# Patient Record
Sex: FEMALE | Race: BLACK OR AFRICAN AMERICAN | NOT HISPANIC OR LATINO | Employment: FULL TIME | URBAN - METROPOLITAN AREA
[De-identification: names, ages, dates, MRNs, and addresses within clinical notes are randomized per-mention and may not be internally consistent; named-entity substitution may affect disease eponyms.]

---

## 2017-11-30 PROBLEM — E04.9 GOITER: Status: ACTIVE | Noted: 2017-11-30

## 2017-11-30 PROBLEM — E61.1 IRON DEFICIENCY: Status: ACTIVE | Noted: 2017-11-30

## 2017-11-30 PROBLEM — D25.0 SUBMUCOUS AND SUBSEROUS LEIOMYOMA OF UTERUS: Status: ACTIVE | Noted: 2017-11-30

## 2017-11-30 PROBLEM — D25.2 SUBMUCOUS AND SUBSEROUS LEIOMYOMA OF UTERUS: Status: ACTIVE | Noted: 2017-11-30

## 2017-12-21 PROBLEM — G43.009 MIGRAINE WITHOUT AURA AND WITHOUT STATUS MIGRAINOSUS, NOT INTRACTABLE: Status: ACTIVE | Noted: 2017-12-21

## 2018-05-01 ENCOUNTER — TELEPHONE (OUTPATIENT)
Dept: ORTHOPEDICS | Facility: CLINIC | Age: 48
End: 2018-05-01

## 2018-05-01 NOTE — TELEPHONE ENCOUNTER
Called the patient and left a message for her to give us a call back.    ----- Message from Yamile Cantrell sent at 5/1/2018 11:15 AM CDT -----  Contact: pt  States she's calling to see if we received a referral from her insurance company. Please call pt at [phone]. Thank you

## 2018-05-03 DIAGNOSIS — M79.641 PAIN IN BOTH HANDS: ICD-10-CM

## 2018-05-03 DIAGNOSIS — M79.642 PAIN IN BOTH HANDS: ICD-10-CM

## 2018-05-03 DIAGNOSIS — M79.601 RIGHT ARM PAIN: Primary | ICD-10-CM

## 2018-05-07 ENCOUNTER — TELEPHONE (OUTPATIENT)
Dept: ORTHOPEDICS | Facility: CLINIC | Age: 48
End: 2018-05-07

## 2018-05-07 ENCOUNTER — OFFICE VISIT (OUTPATIENT)
Dept: ORTHOPEDICS | Facility: CLINIC | Age: 48
End: 2018-05-07
Payer: COMMERCIAL

## 2018-05-07 ENCOUNTER — HOSPITAL ENCOUNTER (OUTPATIENT)
Dept: RADIOLOGY | Facility: HOSPITAL | Age: 48
Discharge: HOME | End: 2018-05-07
Attending: PHYSICIAN ASSISTANT
Payer: COMMERCIAL

## 2018-05-07 VITALS
SYSTOLIC BLOOD PRESSURE: 121 MMHG | DIASTOLIC BLOOD PRESSURE: 69 MMHG | WEIGHT: 177.25 LBS | HEIGHT: 67 IN | BODY MASS INDEX: 27.82 KG/M2 | HEART RATE: 53 BPM | RESPIRATION RATE: 12 BRPM

## 2018-05-07 DIAGNOSIS — M25.511 ACUTE PAIN OF RIGHT SHOULDER: ICD-10-CM

## 2018-05-07 DIAGNOSIS — M25.531 RIGHT WRIST PAIN: ICD-10-CM

## 2018-05-07 DIAGNOSIS — M19.011 DJD OF RIGHT AC (ACROMIOCLAVICULAR) JOINT: ICD-10-CM

## 2018-05-07 DIAGNOSIS — M50.30 DDD (DEGENERATIVE DISC DISEASE), CERVICAL: ICD-10-CM

## 2018-05-07 DIAGNOSIS — M79.642 PAIN IN BOTH HANDS: ICD-10-CM

## 2018-05-07 DIAGNOSIS — M79.641 PAIN IN BOTH HANDS: ICD-10-CM

## 2018-05-07 DIAGNOSIS — M79.601 RIGHT ARM PAIN: ICD-10-CM

## 2018-05-07 DIAGNOSIS — M79.18 MYOFASCIAL PAIN ON RIGHT SIDE: ICD-10-CM

## 2018-05-07 DIAGNOSIS — M75.01 ADHESIVE CAPSULITIS OF RIGHT SHOULDER: Primary | ICD-10-CM

## 2018-05-07 DIAGNOSIS — M75.21 BICEPS TENDINITIS OF RIGHT SHOULDER: ICD-10-CM

## 2018-05-07 PROCEDURE — 73030 X-RAY EXAM OF SHOULDER: CPT | Mod: TC,FY,PO,RT

## 2018-05-07 PROCEDURE — 72050 X-RAY EXAM NECK SPINE 4/5VWS: CPT | Mod: TC,FY,PO

## 2018-05-07 PROCEDURE — 72050 X-RAY EXAM NECK SPINE 4/5VWS: CPT | Mod: 26,,, | Performed by: RADIOLOGY

## 2018-05-07 PROCEDURE — 20610 DRAIN/INJ JOINT/BURSA W/O US: CPT | Mod: RT,S$GLB,, | Performed by: PHYSICIAN ASSISTANT

## 2018-05-07 PROCEDURE — 73110 X-RAY EXAM OF WRIST: CPT | Mod: 26,RT,, | Performed by: RADIOLOGY

## 2018-05-07 PROCEDURE — 73030 X-RAY EXAM OF SHOULDER: CPT | Mod: 26,RT,, | Performed by: RADIOLOGY

## 2018-05-07 PROCEDURE — 73110 X-RAY EXAM OF WRIST: CPT | Mod: 26,LT,, | Performed by: RADIOLOGY

## 2018-05-07 PROCEDURE — 99203 OFFICE O/P NEW LOW 30 MIN: CPT | Mod: 25,SA,S$GLB, | Performed by: PHYSICIAN ASSISTANT

## 2018-05-07 PROCEDURE — 99999 PR PBB SHADOW E&M-EST. PATIENT-LVL III: CPT | Mod: PBBFAC,,, | Performed by: PHYSICIAN ASSISTANT

## 2018-05-07 PROCEDURE — 73110 X-RAY EXAM OF WRIST: CPT | Mod: 50,TC,FY,PO

## 2018-05-07 PROCEDURE — 3008F BODY MASS INDEX DOCD: CPT | Mod: CPTII,S$GLB,, | Performed by: PHYSICIAN ASSISTANT

## 2018-05-07 RX ORDER — METHYLPREDNISOLONE ACETATE 80 MG/ML
80 INJECTION, SUSPENSION INTRA-ARTICULAR; INTRALESIONAL; INTRAMUSCULAR; SOFT TISSUE ONCE
Status: COMPLETED | OUTPATIENT
Start: 2018-05-07 | End: 2018-05-07

## 2018-05-07 RX ORDER — CELECOXIB 200 MG/1
200 CAPSULE ORAL DAILY
Qty: 30 CAPSULE | Refills: 1 | Status: SHIPPED | OUTPATIENT
Start: 2018-05-07 | End: 2018-08-10

## 2018-05-07 RX ADMIN — METHYLPREDNISOLONE ACETATE 80 MG: 80 INJECTION, SUSPENSION INTRA-ARTICULAR; INTRALESIONAL; INTRAMUSCULAR; SOFT TISSUE at 01:05

## 2018-05-07 NOTE — PROGRESS NOTES
Subjective:      Patient ID: Elizabeth Turner is a 47 y.o. female.    Chief Complaint: Pain of the Right Shoulder      HPI: Elizabeth Turner  is a 47 y.o. female who c/o Pain of the Right Shoulder   for duration of 2-1/2 months.  She points to the right shoulder as to where the main source of her pain is.  She denies an inciting injury.  Pain level is 4 out of 10 in severity.  Quality is throbbing.  Alleviating factors ibuprofen, heat, ice.  Aggravating factors include laying on the right shoulder at nighttime, reaching overhead, and reaching back.  She had an intramuscular steroid injection done last week which also seems to have helped some of her pain.  She is also having some wrist pain.  It has improved with the wrist splint.  She denies numbness and tingling in her fingers.  She also has pain in the trapezial area of the bilateral shoulders and upper back.  She equates this with muscular pain.  She has been given a prescription for Flexeril, but does not tolerate medications very well.  She has not been taking it.  She was seen [facility] last week and they prescribed meloxicam 7.5 mg.  She could not tolerate this due to GI side effects.  She has had to stop taking it.    Review of Systems   Constitution: Negative for fever.   Cardiovascular: Negative for chest pain.   Respiratory: Negative for cough and shortness of breath.    Skin: Negative for rash.   Musculoskeletal: Positive for joint pain, myalgias, neck pain and stiffness. Negative for joint swelling.   Gastrointestinal: Negative for heartburn.   Neurological: Negative for headaches and numbness.         Objective:        General    Nursing note and vitals reviewed.  Constitutional: She is oriented to person, place, and time. She appears well-developed and well-nourished.   HENT:   Head: Normocephalic and atraumatic.   Eyes: EOM are normal.   Cardiovascular: Normal rate and regular rhythm.    Pulmonary/Chest: Effort normal.   Abdominal: Soft.    Neurological: She is alert and oriented to person, place, and time.   Psychiatric: She has a normal mood and affect. Her behavior is normal.         Back (L-Spine & T-Spine) / Neck (C-Spine) Exam     Tenderness   The patient is tender to palpation of the right trapezial.     Neck (C-Spine) Range of Motion   Flexion:     Normal  Extension: Normal  Right Rotation: normal  Left Rotation: normal    Spinal Sensation   Right Side Sensation  C-Spine Level: normal   Left Side Sensation  C-Spine Level: normal    Neck (C-Spine) Tests   Spurling's Test   Left:  Negative  Right: negative      Right Hand/Wrist Exam     Inspection   Scars: Wrist - absent   Effusion: Wrist - absent   Bruising: Wrist - absent   Deformity: Wrist - deformity     Range of Motion     Wrist   Extension: normal   Flexion: normal   Pronation: normal   Supination: normal     Tests   Tinels Sign (Medial Nerve): negative    Atrophy   Thenar:  negative  Hypothenar:  negative  Intrinsic:  negative  1st Dorsal Interosseous: negative    Other     Neuorologic Exam    Median Distribution: normal  Ulnar Distribution: normal  Radial Distribution: normal      Right Elbow Exam     Tests Tinel's Sign (cubital tunnel): negative      Right Shoulder Exam     Inspection/Observation   Swelling: absent  Bruising: absent  Scars: absent  Deformity: absent  Scapular Dyskinesia: positive    Tenderness   The patient is tender to palpation of the medial scapula, acromioclavicular joint and biceps tendon.    Range of Motion   Active Abduction:  90 abnormal   Passive Abduction:  110 abnormal   Forward Flexion: normal   Forward Elevation: normal  Adduction: normal  External Rotation 0 degrees: normal   Internal Rotation 0 degrees:  L5 abnormal     Tests & Signs   Cross Arm: negative  Drop Arm: negative  Hawkin's test: positive  Impingement: positive  Active Compression Test (Jefferson's Sign): positive  Speed's Test: positive    Other   Sensation: normal    Muscle Strength   Right  Upper Extremity   Shoulder Abduction: 5/5   Shoulder Internal Rotation: 5/5   Shoulder External Rotation: 5/5   Deltoid:  5/5  Triceps:  5/5  Wrist Extension: 5/5/5   Wrist Flexion: 5/5/5   : 5/5/5   Finger Flexors:  5/5  Pinch Mechanism: 5/5  Left Upper Extremity  Biceps: 5/5/5   Deltoid:  5/5  Triceps:  5/5  Wrist Extension: 5/5/5   Wrist Flexion: 5/5/5   :  5/5/5   Finger Flexors:  5/5  Pinch Mechanism: 5/5    Reflexes     Left Side  Biceps:  2+  Brachioradialis:  2+    Right Side   Biceps:  2+  Brachioradialis:  2+    Vascular Exam     Right Pulses      Radial:                    2+      Left Pulses      Radial:                    2+      Capillary Refill  Right Hand: normal capillary refill            Xray:   Right shoulder from today images and report were reviewed today.  I agree with the radiologist's interpretation.  The glenohumeral and acromioclavicular joints are intact.  No intraosseous lesion.  No acute fracture or dislocation.  Cervical spine from today images and report were reviewed today.  I agree with the radiologist's interpretation.  There is straightening of the usual cervical lordosis.  Degenerative vertebral endplate spurring and facet arthropathy at multiple levels with moderate narrowing of the C5-6 and C6-7 disc spaces.  Mild bony foraminal encroachment bilaterally at C6-7 and on the left at C3-4.  Odontoid is intact.  No fracture or subluxation.  Bilateral wrists from today images and report were reviewed today.  I agree with the radiologist's interpretation.  The joint spaces are preserved.  No periarticular erosions.  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  No acute fracture or dislocation.    Assessment:       Encounter Diagnoses   Name Primary?    Adhesive capsulitis of right shoulder Yes    Acute pain of right shoulder     Biceps tendinitis of right shoulder     DJD of right AC (acromioclavicular) joint     Right wrist pain     Myofascial  pain on right side     DDD (degenerative disc disease), cervical           Plan:       Elizabeth was seen today for pain.    Diagnoses and all orders for this visit:    Adhesive capsulitis of right shoulder  -     methylPREDNISolone acetate injection 80 mg; Inject 1 mL (80 mg total) into the articular space once.  -     Ambulatory Referral to Physical/Occupational Therapy  -     celecoxib (CELEBREX) 200 MG capsule; Take 1 capsule (200 mg total) by mouth once daily. Take with food.  DC if develop GI side effects.    Acute pain of right shoulder  -     methylPREDNISolone acetate injection 80 mg; Inject 1 mL (80 mg total) into the articular space once.  -     Ambulatory Referral to Physical/Occupational Therapy  -     celecoxib (CELEBREX) 200 MG capsule; Take 1 capsule (200 mg total) by mouth once daily. Take with food.  DC if develop GI side effects.    Biceps tendinitis of right shoulder  -     methylPREDNISolone acetate injection 80 mg; Inject 1 mL (80 mg total) into the articular space once.  -     Ambulatory Referral to Physical/Occupational Therapy  -     celecoxib (CELEBREX) 200 MG capsule; Take 1 capsule (200 mg total) by mouth once daily. Take with food.  DC if develop GI side effects.    DJD of right AC (acromioclavicular) joint  -     methylPREDNISolone acetate injection 80 mg; Inject 1 mL (80 mg total) into the articular space once.  -     Ambulatory Referral to Physical/Occupational Therapy  -     celecoxib (CELEBREX) 200 MG capsule; Take 1 capsule (200 mg total) by mouth once daily. Take with food.  DC if develop GI side effects.    Right wrist pain  -     Ambulatory Referral to Physical/Occupational Therapy  -     celecoxib (CELEBREX) 200 MG capsule; Take 1 capsule (200 mg total) by mouth once daily. Take with food.  DC if develop GI side effects.    Myofascial pain on right side  -     Ambulatory Referral to Physical/Occupational Therapy  -     celecoxib (CELEBREX) 200 MG capsule; Take 1 capsule (200 mg  total) by mouth once daily. Take with food.  DC if develop GI side effects.    DDD (degenerative disc disease), cervical  -     Ambulatory Referral to Physical/Occupational Therapy  -     celecoxib (CELEBREX) 200 MG capsule; Take 1 capsule (200 mg total) by mouth once daily. Take with food.  DC if develop GI side effects.    Ms. Turner is a new patient with a new problem as above.  She has adhesive capsulitis in the right shoulder.  I recommend aggressive range of motion through physical therapy.  I have encouraged her to use the shoulder to prevent further stiffness which can increase pain.  She understands that regaining range of motion is very important to help pain in the shoulder.  We have also discussed risks and benefits of a steroid injection.  She wishes to proceed with that today.  Although, she does have degenerative changes within her neck, I do not think that her pain is radicular in nature.  However, she understands that he has degenerative disks with some spinal radial tunnel stenosis at multiple levels in the neck, ultimately could be contributing to some of the pain in the right arm.  Physical therapy will also work with her on the neck as well as myofascial release on the right side.  Incorporate dry needling, stimulation, heat, and manual therapeutic massage as necessary to improve her symptoms.  She is not tolerate many medications very well.  She has failed  oral NSAIDs in the form of meloxicam as well as over-the-counter anti-inflammatories.  I recommend a prescription for Celebrex as above.  If she develops any GI side effects, she will discontinue the medication and contact my office.  I will see her back in the office in 6 weeks to reevaluate her progress.  Physical therapy may also work with her on her wrist.  I will check on her wrist at follow-up.  She verbalizes understanding and agrees with the above plan.  Follow-up in about 6 weeks (around 6/18/2018).        Right Shoulder Injection  Report:  After verbal consent was obtained for right shoulder injection, patient ID, site, and side were verified.  The  right  Shoulder was sterilly prepped in the standard fashion.  A 22-gauge needle was introduced into right subacromial space from the posterior portal approach without complication. The right shoulder was then injected with 20 mg lidocaine plain and 80 mg depomedrol.  A sterile bandaid was applied.  The patient was informed to apply an ice pack approximately 10min once arriving home and not to do anything strenuous for 24hours. She was instructed to call if there were any problems. The patient was discharged in stable condition.    The patient understands, chooses and consents to this plan and accepts all   the risks which include but are not limited to the risks mentioned above.     Disclaimer: This note was prepared using a voice recognition system and is likely to have sound alike errors within the text.

## 2018-05-07 NOTE — LETTER
May 7, 2018      ANGELINA Blackburn  [address] [place] [address]           [facility]  [address] [place] [address]  Phone: [phone]  Fax: [phone]          Patient: Elizabeth Turner   MR Number: [id number]   YOB: 1970   Date of Visit: 5/7/2018       Dear Chilango Jeffrey:    Thank you for referring Elizabeth Turner to me for evaluation. Attached you will find relevant portions of my assessment and plan of care.    If you have questions, please do not hesitate to call me. I look forward to following Elizabeth Turner along with you.    Sincerely,    TRISH Kiranosure  CC:  No Recipients    If you would like to receive this communication electronically, please contact [email or web address] or [phone] to request more information on [facility] Link access.    For providers and/or their staff who would like to refer a patient to [facility], please contact us through our one-stop-shop provider referral line, [facility] , at [phone].    If you feel you have received this communication in error or would no longer like to receive these types of communications, please e-mail [email or web address]

## 2018-05-07 NOTE — TELEPHONE ENCOUNTER
----- Message from Zonia Rossi sent at 5/7/2018  7:53 AM CDT -----  Contact: PT  Call pt at  [phone] pt states that she already had some xrays done.

## 2018-05-17 ENCOUNTER — TELEPHONE (OUTPATIENT)
Dept: ORTHOPEDICS | Facility: CLINIC | Age: 48
End: 2018-05-17

## 2018-05-17 NOTE — TELEPHONE ENCOUNTER
Called pt to reschedule apt. Her apt was for June 22, her apt was reschedule because Lauren will be out of office. She will be seen that following Monday June 25.

## 2018-06-25 ENCOUNTER — OFFICE VISIT (OUTPATIENT)
Dept: ORTHOPEDICS | Facility: CLINIC | Age: 48
End: 2018-06-25
Payer: COMMERCIAL

## 2018-06-25 VITALS
SYSTOLIC BLOOD PRESSURE: 118 MMHG | BODY MASS INDEX: 27.82 KG/M2 | HEART RATE: 65 BPM | DIASTOLIC BLOOD PRESSURE: 80 MMHG | WEIGHT: 177.25 LBS | RESPIRATION RATE: 12 BRPM | HEIGHT: 67 IN

## 2018-06-25 DIAGNOSIS — M25.511 ACUTE PAIN OF RIGHT SHOULDER: ICD-10-CM

## 2018-06-25 DIAGNOSIS — M75.01 ADHESIVE CAPSULITIS OF RIGHT SHOULDER: Primary | ICD-10-CM

## 2018-06-25 DIAGNOSIS — M19.011 DJD OF RIGHT AC (ACROMIOCLAVICULAR) JOINT: ICD-10-CM

## 2018-06-25 DIAGNOSIS — M79.18 MYOFASCIAL PAIN ON RIGHT SIDE: ICD-10-CM

## 2018-06-25 PROCEDURE — 3008F BODY MASS INDEX DOCD: CPT | Mod: CPTII,S$GLB,, | Performed by: PHYSICIAN ASSISTANT

## 2018-06-25 PROCEDURE — 99213 OFFICE O/P EST LOW 20 MIN: CPT | Mod: S$GLB,,, | Performed by: PHYSICIAN ASSISTANT

## 2018-06-25 PROCEDURE — 99999 PR PBB SHADOW E&M-EST. PATIENT-LVL III: CPT | Mod: PBBFAC,,, | Performed by: PHYSICIAN ASSISTANT

## 2018-06-25 NOTE — PROGRESS NOTES
Patient ID: Elizabeth Turner is a 47 y.o. female.    Chief Complaint: Pain and Follow-up of the Right Shoulder      HPI: Elizabeth Turner  is a 47 y.o. female who c/o Pain and Follow-up of the Right Shoulder   for duration of about 4 months now.  Her pain level today is 3/10 in severity.  I have been treating her for adhesive capsulitis of right shoulder. She is improved since her last office visit about a month and a half ago.  She has been doing physical therapy at [facility] in [place].  She states the steroid injection at her last office visit helped alleviate her symptoms. Pain is worsened with abduction, internal rotation. Alleviating factors include physical therapy, steroid injections, anti-inflammatories.  Quality is an intermittent throbbing pain. No associated numbness or tingling.    Past Medical History:   Diagnosis Date    Encounter for general adult medical examination without abnormal findings 05/16/2016    Iron deficiency anemia     Migraine      Past Surgical History:   Procedure Laterality Date    TUBAL LIGATION       Family History   Problem Relation Age of Onset    Cancer Mother     Diabetes Mother     Hypertension Mother     Hypertension Father     Diabetes Sister     Hypertension Sister     Hypertension Maternal Grandfather     Stroke Paternal Grandfather      Social History     Social History    Marital status:      Spouse name: N/A    Number of children: N/A    Years of education: N/A     Occupational History    Not on file.     Social History Main Topics    Smoking status: Never Smoker    Smokeless tobacco: Never Used    Alcohol use No    Drug use: No    Sexual activity: Yes     Partners: Male     Other Topics Concern    Not on file     Social History Narrative    No narrative on file     Medication List with Changes/Refills   Current Medications    CELECOXIB (CELEBREX) 200 MG CAPSULE    Take 1 capsule (200 mg total) by mouth once daily. Take with food.  DC if  develop GI side effects.    SOILA PRIM-LINOLEIC-GAMOLENIC AC (PRIMROSE OIL) 1,000 MG CAP    Take by mouth.     Review of patient's allergies indicates:  No Known Allergies        Objective:        General    Nursing note and vitals reviewed.  Constitutional: She is oriented to person, place, and time. She appears well-developed and well-nourished.   HENT:   Head: Normocephalic and atraumatic.   Eyes: EOM are normal.   Cardiovascular: Normal rate and regular rhythm.    Pulmonary/Chest: Effort normal.   Abdominal: Soft.   Neurological: She is alert and oriented to person, place, and time.   Psychiatric: She has a normal mood and affect. Her behavior is normal.         Back (L-Spine & T-Spine) / Neck (C-Spine) Exam     Neck (C-Spine) Range of Motion   Flexion:     Normal  Extension: Normal  Right Rotation: normal  Left Rotation: normal    Spinal Sensation   Right Side Sensation  C-Spine Level: normal   Left Side Sensation  C-Spine Level: normal    Neck (C-Spine) Tests   Spurling's Test   Left:  Negative  Right: negative      Right Hand/Wrist Exam     Inspection   Scars: Wrist - absent   Effusion: Wrist - absent   Bruising: Wrist - absent   Deformity: Wrist - deformity     Range of Motion     Wrist   Extension: normal   Flexion: normal   Pronation: normal   Supination: normal     Tests   Tinels Sign (Medial Nerve): negative    Atrophy   Thenar:  negative  Hypothenar:  negative  Intrinsic:  negative  1st Dorsal Interosseous: negative    Other     Neuorologic Exam    Median Distribution: normal  Ulnar Distribution: normal  Radial Distribution: normal      Right Elbow Exam     Tests Tinel's Sign (cubital tunnel): negative      Right Shoulder Exam     Inspection/Observation   Swelling: absent  Bruising: absent  Scars: absent  Deformity: absent  Scapular Dyskinesia: positive    Range of Motion   Active Abduction: 120   Passive Abduction:  160 abnormal   Forward Flexion:  150 abnormal   Forward Elevation: abnormal  Adduction:  normal  External Rotation 0 degrees: normal   Internal Rotation 0 degrees:  L5 abnormal     Tests & Signs   Cross Arm: negative  Drop Arm: negative  Hawkin's test: negative  Impingement: negative  Active Compression Test (Worth's Sign): negative  Speed's Test: negative    Other   Sensation: normal    Comments:  Myofascial tenderness to palpation is much improved from last office visit.    Muscle Strength   Right Upper Extremity   Shoulder Abduction: 5/5   Shoulder Internal Rotation: 5/5   Shoulder External Rotation: 5/5   Deltoid:  5/5  Triceps:  5/5  Wrist Extension: 5/5/5   Wrist Flexion: 5/5/5   : 5/5/5   Finger Flexors:  5/5  Pinch Mechanism: 5/5  Left Upper Extremity  Biceps: 5/5/5   Deltoid:  5/5  Triceps:  5/5  Wrist Extension: 5/5/5   Wrist Flexion: 5/5/5   :  5/5/5   Finger Flexors:  5/5  Pinch Mechanism: 5/5    Reflexes     Left Side  Biceps:  2+  Brachioradialis:  2+    Right Side   Biceps:  2+  Brachioradialis:  2+    Vascular Exam     Right Pulses      Radial:                    2+      Left Pulses      Radial:                    2+      Capillary Refill  Right Hand: normal capillary refill              Assessment:       Encounter Diagnoses   Name Primary?    Adhesive capsulitis of right shoulder Yes    Acute pain of right shoulder     DJD of right AC (acromioclavicular) joint     Myofascial pain on right side           Plan:       Elizabeth was seen today for pain and follow-up.    Diagnoses and all orders for this visit:    Adhesive capsulitis of right shoulder  -     Ambulatory consult to Physical Therapy    Acute pain of right shoulder    DJD of right AC (acromioclavicular) joint    Myofascial pain on right side    Ms. Johnson comes in today for follow-up on the above problems.  These are establish problems which are improved.  She still has a good bit of stiffness on exam.  However, her pain and her motion is improved significantly since her last office visit.  I recommend continuing  with physical therapy as well as doing a home exercise program geared towards working on shoulder flexibility.  I have given her some exercises today to do that.  She is concerned about the co payments for physical therapy.  She states that therapy is getting expensive.  She would like to do more of a directed home program.  I have cautioned her against losing any progress that she has made. I also recommend a pulley for home use.  She will follow up with me in the office in 6 weeks.  She understands that adhesive capsulitis is a long recovery with extended physical therapy.  She verbalizes understanding and agrees with the above plan.  She will let me know if she has problems prior to follow-up.  Follow-up in about 6 weeks (around 8/6/2018).          The patient understands, chooses and consents to this plan and accepts all   the risks which include but are not limited to the risks mentioned above.     Disclaimer: This note was prepared using a voice recognition system and is likely to have sound alike errors within the text.

## 2018-06-25 NOTE — PATIENT INSTRUCTIONS
Doorway Pectoral Stretch (Flexibility)    1.  an open doorway. Raise each arm up to the side, bent at 90-degree angles with palms forward. Rest your palms on the door frame.  2. Slowly step forward with one foot. Feel the stretch in your shoulders and chest. Stand upright and dont lean forward.  3. Hold for 30 seconds. Step back and relax.  4. Repeat 3 times, or as instructed.  Date Last Reviewed: 3/10/2016  © 2133-7141 [employer]. [address] [place] [address]. All rights reserved. This information is not intended as a substitute for professional medical care. Always follow your healthcare professional's instructions.        Exercises for Shoulder Flexibility: External Rotation    This stretch can help restore shoulder flexibility and relieve pain over time. When stretching, be sure to breathe deeply. Follow any special instructions from your doctor or physical therapist:  5.  a doorway. Grasp the doorjamb with the hand on the frozen side. Your arm should be bent.  6. With the other hand, hold the elbow on the frozen side firmly against your body.  7. Standing in the same spot, rotate your body away from the doorjamb. Stop when you feel the stretch in the shoulder. At first, try to hold the stretch for 5 seconds.  8. Work up to doing 3 sets of this stretch, 3 times a day. Work up to holding the stretch for 30 to 60 seconds.  Note: Keep your arms as still as you can. Over time, rotate your body a little more to enhance the stretch. But be careful not to twist your back.  Frozen shoulder  Frozen shoulder is another name for adhesive capsulitis, which causes restricted movement in the shoulder. If you have frozen shoulder, this stretch may cause discomfort, especially when you first get started. A few months may pass before you achieve the results you want. But once your shoulder heals, it rarely becomes frozen again. So stick to your stretching program. If you have any  questions, be sure to ask your doctor.   Date Last Reviewed: 8/16/2015  © 5305-1596 [employer]. [address] [place] [address]. All rights reserved. This information is not intended as a substitute for professional medical care. Always follow your healthcare professional's instructions.        Shoulder Abduction (Flexibility)    9. Stand up straight. Or lie on your back on the floor with legs straight. Hold a broomstick horizontally. Cup the top of the broomstick with your right hand. Hold the broomstick just above the broom with your left hand, palm facing down.  10. Push the broomstick to the right with your left hand, raising your right arm up. Raise it only as high as feels comfortable.  11. Hold for a few seconds. Return to the starting position.  12. Repeat 3 to 5 times, or as instructed. Build up to holding each stretch for 10 to 30 seconds.     Tip: If you dont have a broom, you can also do this exercise with a cane, mop, or yardstick.      Date Last Reviewed: 3/10/2016  © 7456-2728 [employer]. [address] [place] [address]. All rights reserved. This information is not intended as a substitute for professional medical care. Always follow your healthcare professional's instructions.        Shoulder Flexion (Flexibility)    13. Sit in a chair sideways next to a table. Lay your right arm on the table, pointed forward.  14. Slowly lean forward.  Slide your arm forward on the table. Feel the stretch in your right shoulder.  15. Hold for 5 seconds. Slowly sit back up.  16. Repeat 5 times.  17. Repeat this exercise 3 times a day, or as instructed.  Date Last Reviewed: 3/10/2016  © 1515-6382 [employer]. [address] [place] [address]. All rights reserved. This information is not intended as a substitute for professional medical care. Always follow your healthcare professional's instructions.

## 2018-08-10 ENCOUNTER — OFFICE VISIT (OUTPATIENT)
Dept: ORTHOPEDICS | Facility: CLINIC | Age: 48
End: 2018-08-10
Payer: COMMERCIAL

## 2018-08-10 VITALS — RESPIRATION RATE: 12 BRPM | DIASTOLIC BLOOD PRESSURE: 78 MMHG | HEART RATE: 61 BPM | SYSTOLIC BLOOD PRESSURE: 123 MMHG

## 2018-08-10 DIAGNOSIS — M25.511 ACUTE PAIN OF RIGHT SHOULDER: ICD-10-CM

## 2018-08-10 DIAGNOSIS — M75.01 ADHESIVE CAPSULITIS OF RIGHT SHOULDER: Primary | ICD-10-CM

## 2018-08-10 DIAGNOSIS — M19.011 DJD OF RIGHT AC (ACROMIOCLAVICULAR) JOINT: ICD-10-CM

## 2018-08-10 PROCEDURE — 99213 OFFICE O/P EST LOW 20 MIN: CPT | Mod: S$GLB,,, | Performed by: PHYSICIAN ASSISTANT

## 2018-08-10 PROCEDURE — 99999 PR PBB SHADOW E&M-EST. PATIENT-LVL III: CPT | Mod: PBBFAC,,, | Performed by: PHYSICIAN ASSISTANT

## 2018-08-10 NOTE — PROGRESS NOTES
Patient ID: Elizabeth Turner is a 48 y.o. female.    Chief Complaint: Pain and Follow-up of the Right Shoulder      HPI: Elizabeth Turner  is a 48 y.o. female who c/o Pain and Follow-up of the Right Shoulder   for duration of about 5 months.  I have been treating her for right shoulder adhesive capsulitis.  She is been working diligently with physical therapy at [facility] in [facility].  They have her working on a home exercise program for continued range of motion. She had her last visit with them on Monday.  Pain level today is 0/10.  She has occasional discomfort, but is doing much better than she was.  Her mom shoulder still feels somewhat stiff, but again, the motion has improved drastically.  Quality is an occasional twinge or ache.  It is intermittent.  Alleviating factors include physical therapy.  Aggravating factors include terminal motion. She denies radicular symptoms.  She denies numbness and tingling.     Past Medical History:   Diagnosis Date    Encounter for general adult medical examination without abnormal findings 05/16/2016    Iron deficiency anemia     Migraine      Past Surgical History:   Procedure Laterality Date    TUBAL LIGATION       Family History   Problem Relation Age of Onset    Cancer Mother     Diabetes Mother     Hypertension Mother     Hypertension Father     Diabetes Sister     Hypertension Sister     Hypertension Maternal Grandfather     Stroke Paternal Grandfather      Social History     Social History    Marital status:      Spouse name: N/A    Number of children: N/A    Years of education: N/A     Occupational History    Not on file.     Social History Main Topics    Smoking status: Never Smoker    Smokeless tobacco: Never Used    Alcohol use No    Drug use: No    Sexual activity: Yes     Partners: Male     Other Topics Concern    Not on file     Social History Narrative    No narrative on file     Medication List with Changes/Refills    Current Medications    SOILA PRIM-LINOLEIC-GAMOLENIC AC (PRIMROSE OIL) 1,000 MG CAP    Take by mouth.   Discontinued Medications    CELECOXIB (CELEBREX) 200 MG CAPSULE    Take 1 capsule (200 mg total) by mouth once daily. Take with food.  DC if develop GI side effects.     Review of patient's allergies indicates:  No Known Allergies        Objective:        General    Nursing note and vitals reviewed.  Constitutional: She is oriented to person, place, and time. She appears well-developed and well-nourished.   HENT:   Head: Normocephalic and atraumatic.   Eyes: EOM are normal.   Cardiovascular: Normal rate and regular rhythm.    Pulmonary/Chest: Effort normal.   Abdominal: Soft.   Neurological: She is alert and oriented to person, place, and time.   Psychiatric: She has a normal mood and affect. Her behavior is normal.         Right Shoulder Exam     Inspection/Observation   Swelling: absent  Bruising: absent  Scars: absent  Deformity: absent  Scapular Dyskinesia: positive  Atrophy: absent    Tenderness   The patient is experiencing no tenderness.        Range of Motion   Active Abduction:  150 abnormal   Passive Abduction:  160 abnormal   Extension: abnormal   Forward Flexion:  160 abnormal   Forward Elevation: abnormal  Adduction: normal  External Rotation 0 degrees: abnormal   Internal Rotation 0 degrees:  L4 abnormal     Tests & Signs   Cross Arm: negative  Drop Arm: negative  Hawkin's test: negative  Impingement: negative  Active Compression Test (Bossier's Sign): negative  Speed's Test: negative    Other   Sensation: normal    Comments:  Near full A/PROM right should  ROM much improved  No TTP  Still some stiffness      Muscle Strength   Right Upper Extremity   Shoulder Abduction: 5/5   Shoulder Internal Rotation: 5/5   Shoulder External Rotation: 5/5     Vascular Exam     Right Pulses      Radial:                    2+      Capillary Refill  Right Hand: normal capillary refill              Assessment:        Encounter Diagnoses   Name Primary?    Adhesive capsulitis of right shoulder Yes    Acute pain of right shoulder     DJD of right AC (acromioclavicular) joint           Plan:       Elizabeth was seen today for pain and follow-up.    Diagnoses and all orders for this visit:    Adhesive capsulitis of right shoulder    Acute pain of right shoulder    DJD of right AC (acromioclavicular) joint    Ms. Turner comes in today for an established problem as above.  She is improved.  Functionally, she is getting back to normal.  She does still have some stiffness in the shoulder, but is happy with her progress.  She completed physical therapy this week and they have released her to a home exercise program.  I have encouraged her to continue with her daily exercises for continued improvements in her motion. She has good strength and no tenderness on exam today. I have offered to see her back in the office in 2-3 months.  She has politely declined that today but states that if her motion starts to get worse, or the pain returns, she will certainly make an appointment to come back for re-evaluation.  She verbalizes understanding and agrees with the above plan.  Follow-up if symptoms worsen or fail to improve.          The patient understands, chooses and consents to this plan and accepts all   the risks which include but are not limited to the risks mentioned above.     Disclaimer: This note was prepared using a voice recognition system and is likely to have sound alike errors within the text.